# Patient Record
Sex: FEMALE | Race: WHITE
[De-identification: names, ages, dates, MRNs, and addresses within clinical notes are randomized per-mention and may not be internally consistent; named-entity substitution may affect disease eponyms.]

---

## 2022-07-22 PROBLEM — Z00.00 ENCOUNTER FOR PREVENTIVE HEALTH EXAMINATION: Status: ACTIVE | Noted: 2022-07-22

## 2022-10-18 ENCOUNTER — APPOINTMENT (OUTPATIENT)
Dept: ORTHOPEDIC SURGERY | Facility: CLINIC | Age: 87
End: 2022-10-18

## 2022-10-18 VITALS — BODY MASS INDEX: 21.26 KG/M2 | WEIGHT: 120 LBS | HEIGHT: 63 IN

## 2022-10-18 DIAGNOSIS — M25.562 PAIN IN LEFT KNEE: ICD-10-CM

## 2022-10-18 DIAGNOSIS — Z98.890 OTHER SPECIFIED POSTPROCEDURAL STATES: ICD-10-CM

## 2022-10-18 DIAGNOSIS — Z78.9 OTHER SPECIFIED HEALTH STATUS: ICD-10-CM

## 2022-10-18 DIAGNOSIS — Z86.39 PERSONAL HISTORY OF OTHER ENDOCRINE, NUTRITIONAL AND METABOLIC DISEASE: ICD-10-CM

## 2022-10-18 DIAGNOSIS — Z87.39 PERSONAL HISTORY OF OTHER DISEASES OF THE MUSCULOSKELETAL SYSTEM AND CONNECTIVE TISSUE: ICD-10-CM

## 2022-10-18 DIAGNOSIS — Z85.72 PERSONAL HISTORY OF NON-HODGKIN LYMPHOMAS: ICD-10-CM

## 2022-10-18 PROCEDURE — 99203 OFFICE O/P NEW LOW 30 MIN: CPT

## 2022-10-18 RX ORDER — ASPIRIN 81 MG/1
81 TABLET ORAL
Refills: 0 | Status: ACTIVE | COMMUNITY

## 2022-10-18 RX ORDER — LEVOTHYROXINE SODIUM 88 UG/1
88 TABLET ORAL
Refills: 0 | Status: ACTIVE | COMMUNITY

## 2022-10-18 RX ORDER — ALENDRONATE SODIUM 70 MG/1
70 TABLET ORAL
Qty: 12 | Refills: 0 | Status: ACTIVE | COMMUNITY
Start: 2022-08-31

## 2022-10-18 NOTE — DISCUSSION/SUMMARY
[de-identified] : Discussed importance of non-impact exercise and muscle stretching before and after exercise. Reviewed x-ray. Explained the importance of ice and rest. \par Discussed options such as physical therapy, but patient decided to hold off for now. Counseled patient on home exercise program

## 2022-10-18 NOTE — PHYSICAL EXAM
[4___] : adduction 4[unfilled]/5 [AP] : anteroposterior [Outside films reviewed] : Outside films reviewed [There are no fractures, subluxations or dislocations. No significant abnormalities are seen] : There are no fractures, subluxations or dislocations. No significant abnormalities are seen [Moderate arthritis (Tonnis Grade 2)] : Moderate arthritis (Tonnis Grade 2) [FreeTextEntry3] : c [FreeTextEntry8] : greater troch tenderness [Left] : left knee [5___] : hamstring 5[unfilled]/5 [] : light touch is intact throughout [FreeTextEntry9] : 140 flexion [de-identified] : 4 [de-identified] : extension 0 degrees

## 2022-10-18 NOTE — REASON FOR VISIT
[FreeTextEntry2] : here today for left hip pain x 1year   c/o pain on and off , today is a good day pain 0/10.  c/o groin and lower buttock pain when she gets it. c/o difficulty getting out of chair and car.   she is walking 2 miles a day now.  uses voltaren gel which helped (using for over a year now).  Difficulty with stairs. Sleeping well.

## 2023-02-14 ENCOUNTER — FORM ENCOUNTER (OUTPATIENT)
Age: 88
End: 2023-02-14

## 2023-03-09 ENCOUNTER — APPOINTMENT (OUTPATIENT)
Dept: ORTHOPEDIC SURGERY | Facility: CLINIC | Age: 88
End: 2023-03-09
Payer: MEDICARE

## 2023-03-09 VITALS — HEIGHT: 63 IN | WEIGHT: 120 LBS | BODY MASS INDEX: 21.26 KG/M2

## 2023-03-09 PROCEDURE — 99214 OFFICE O/P EST MOD 30 MIN: CPT

## 2023-03-09 NOTE — DISCUSSION/SUMMARY
[Surgical risks reviewed] : Surgical risks reviewed [de-identified] : I spent time going in detail the problem and the associated risks/benefits of hip replacement surgery. detailed complications but not limited to: of nerve injury, non union, repeat fx, dvt/pe postop, instability,transfusion, infection, NVA injury, stiffness, leg length discrepancy, inability to ambulate & death. discussed implant and model shown to patient. answered all patient questions. patient understands procedure and postop directions. Patient has failed conservative treatment and  PT is contraindicated at this time \par All of the pt questions were answered\par pt will remain scheduled for 03/27/23 for left MARIS\par \par Concerned about iron issues - [facility] will discuss with hematologist

## 2023-03-13 ENCOUNTER — FORM ENCOUNTER (OUTPATIENT)
Age: 88
End: 2023-03-13

## 2023-03-21 ENCOUNTER — FORM ENCOUNTER (OUTPATIENT)
Age: 88
End: 2023-03-21

## 2023-03-27 ENCOUNTER — APPOINTMENT (OUTPATIENT)
Dept: ORTHOPEDIC SURGERY | Facility: AMBULATORY MEDICAL SERVICES | Age: 88
End: 2023-03-27
Payer: MEDICARE

## 2023-03-27 PROCEDURE — 27130 TOTAL HIP ARTHROPLASTY: CPT | Mod: AS,LT

## 2023-03-27 PROCEDURE — 27130 TOTAL HIP ARTHROPLASTY: CPT | Mod: LT

## 2023-04-07 ENCOUNTER — APPOINTMENT (OUTPATIENT)
Dept: ORTHOPEDIC SURGERY | Facility: CLINIC | Age: 88
End: 2023-04-07
Payer: MEDICARE

## 2023-04-07 PROCEDURE — 99024 POSTOP FOLLOW-UP VISIT: CPT

## 2023-04-07 NOTE — DISCUSSION/SUMMARY
[de-identified] : pt will continue PT with hip precautions - script provided\par pt will take aspirin and wear stockings on LLE until a month post op\par pt will continue to work on ROM\par f/u 4 weeks

## 2023-04-07 NOTE — REASON FOR VISIT
[FreeTextEntry2] : here for f/u left MARIS 3/27/23. using walker for ambulation and wearing stockings.  using asa bid for anti coag.  still getting home PT. \par using tylenol for pain.  states she has minimal pain

## 2023-04-07 NOTE — PHYSICAL EXAM
[Left] : left hip [] : no sign of infection [FreeTextEntry3] : Wale Removed C/D/I  [FreeTextEntry9] : Flex 90* IR 30* Ext 20* ABD 45*

## 2023-04-18 ENCOUNTER — FORM ENCOUNTER (OUTPATIENT)
Age: 88
End: 2023-04-18

## 2023-05-11 ENCOUNTER — APPOINTMENT (OUTPATIENT)
Dept: ORTHOPEDIC SURGERY | Facility: CLINIC | Age: 88
End: 2023-05-11
Payer: MEDICARE

## 2023-05-11 PROCEDURE — 99024 POSTOP FOLLOW-UP VISIT: CPT

## 2023-05-11 PROCEDURE — 73502 X-RAY EXAM HIP UNI 2-3 VIEWS: CPT

## 2023-05-11 NOTE — PHYSICAL EXAM
[5___] : adduction 5[unfilled]/5 [Left] : left hip with pelvis [AP] : anteroposterior [Lateral] : lateral [Components well fixed, in good position] : Components well fixed, in good position [] : no sign of infection [FreeTextEntry9] : well fixed well aligned; cemented stem

## 2023-05-11 NOTE — DISCUSSION/SUMMARY
[de-identified] : pt is doing well and is happy \par Discussed importance of non-impact exercise and muscle stretching before and after exercise. Reviewed x-rays  Explained the importance of ice and rest.    Stretching exercises shown,  Explained the importance of Range of Motion before strength. \par f/u 6 weeks

## 2023-06-22 ENCOUNTER — APPOINTMENT (OUTPATIENT)
Dept: ORTHOPEDIC SURGERY | Facility: CLINIC | Age: 88
End: 2023-06-22
Payer: MEDICARE

## 2023-06-22 DIAGNOSIS — M16.12 UNILATERAL PRIMARY OSTEOARTHRITIS, LEFT HIP: ICD-10-CM

## 2023-06-22 DIAGNOSIS — Z96.642 PRESENCE OF LEFT ARTIFICIAL HIP JOINT: ICD-10-CM

## 2023-06-22 PROCEDURE — 99024 POSTOP FOLLOW-UP VISIT: CPT

## 2023-06-22 PROCEDURE — 73502 X-RAY EXAM HIP UNI 2-3 VIEWS: CPT

## 2023-06-22 NOTE — PHYSICAL EXAM
[Left] : left hip with pelvis [AP] : anteroposterior [Lateral] : lateral [Components well fixed, in good position] : Components well fixed, in good position [4___] : adduction 4[unfilled]/5 [] : patient ambulates without assistive device [FreeTextEntry9] : well fixed well aligned; cemented stem

## 2023-06-22 NOTE — DISCUSSION/SUMMARY
[de-identified] : Discussed importance of non-impact exercise and muscle stretching before and after exercise. Reviewed x-rays  Explained the importance of ice and rest.    Stretching exercises shown,  Explained the importance of Range of Motion before strength.\par \par Continue home strengthening and stretching program consisting of non-impact exercises and ice as needed. Return to office 9 months\par

## 2023-06-22 NOTE — REASON FOR VISIT
[FreeTextEntry2] : here for f/u left MARIS 3/27/23.ambulating well independantly.  no pain. has been d/c'd from PT.

## 2025-04-22 ENCOUNTER — APPOINTMENT (OUTPATIENT)
Dept: UROLOGY | Facility: CLINIC | Age: 89
End: 2025-04-22

## 2025-04-25 ENCOUNTER — TRANSCRIPTION ENCOUNTER (OUTPATIENT)
Age: 89
End: 2025-04-25

## 2025-05-09 ENCOUNTER — TRANSCRIPTION ENCOUNTER (OUTPATIENT)
Age: 89
End: 2025-05-09